# Patient Record
Sex: FEMALE | Employment: FULL TIME | URBAN - METROPOLITAN AREA
[De-identification: names, ages, dates, MRNs, and addresses within clinical notes are randomized per-mention and may not be internally consistent; named-entity substitution may affect disease eponyms.]

---

## 2017-04-12 ENCOUNTER — OFFICE VISIT (OUTPATIENT)
Dept: INTERNAL MEDICINE CLINIC | Facility: CLINIC | Age: 27
End: 2017-04-12

## 2017-04-12 VITALS
BODY MASS INDEX: 25.68 KG/M2 | WEIGHT: 156 LBS | DIASTOLIC BLOOD PRESSURE: 63 MMHG | HEIGHT: 65.5 IN | HEART RATE: 82 BPM | TEMPERATURE: 98 F | RESPIRATION RATE: 20 BRPM | SYSTOLIC BLOOD PRESSURE: 99 MMHG

## 2017-04-12 DIAGNOSIS — R39.89 URINARY PROBLEM: ICD-10-CM

## 2017-04-12 DIAGNOSIS — R42 DIZZINESS: ICD-10-CM

## 2017-04-12 DIAGNOSIS — Z23 NEED FOR TDAP VACCINATION: ICD-10-CM

## 2017-04-12 DIAGNOSIS — R10.30 LOWER ABDOMINAL PAIN: ICD-10-CM

## 2017-04-12 DIAGNOSIS — R53.83 OTHER FATIGUE: Primary | ICD-10-CM

## 2017-04-12 PROCEDURE — 90471 IMMUNIZATION ADMIN: CPT | Performed by: INTERNAL MEDICINE

## 2017-04-12 PROCEDURE — 81002 URINALYSIS NONAUTO W/O SCOPE: CPT | Performed by: INTERNAL MEDICINE

## 2017-04-12 PROCEDURE — 90715 TDAP VACCINE 7 YRS/> IM: CPT | Performed by: INTERNAL MEDICINE

## 2017-04-12 PROCEDURE — 99213 OFFICE O/P EST LOW 20 MIN: CPT | Performed by: INTERNAL MEDICINE

## 2017-04-12 PROCEDURE — 99204 OFFICE O/P NEW MOD 45 MIN: CPT | Performed by: INTERNAL MEDICINE

## 2017-04-12 RX ORDER — METOPROLOL SUCCINATE 25 MG/1
25 TABLET, EXTENDED RELEASE ORAL DAILY
Qty: 90 TABLET | Refills: 0 | Status: SHIPPED | OUTPATIENT
Start: 2017-04-12 | End: 2017-04-12 | Stop reason: CLARIF

## 2017-04-13 NOTE — PROGRESS NOTES
HPI:    Patient ID: Hannah Valdivia is a 31year old female presents for evaluation of several concerns.     HPI  Patient reports that lately she has been feeling extremely fatigued, feels lightheaded when she changes body position, experiences cramps in bot lethargy  Cardiovascular:  Negative for chest pain and irregular heartbeat/palpitations  Respiratory:  Negative for cough, dyspnea and wheezing.   Eyes:  Negative for eye discharge and vision loss  Endocrine:  Negative for polydipsia and polyphagia  Integum Gait normal.   Skin: Skin is warm and dry. No rash noted. Psychiatric: She has a normal mood and affect.  Her behavior is normal. Judgment normal.              ASSESSMENT/PLAN:   Other fatigue  (primary encounter diagnosis) etiology to be determined, most

## 2017-04-24 ENCOUNTER — HOSPITAL ENCOUNTER (OUTPATIENT)
Dept: ULTRASOUND IMAGING | Facility: HOSPITAL | Age: 27
Discharge: HOME | End: 2017-04-24
Attending: INTERNAL MEDICINE
Payer: COMMERCIAL

## 2017-04-24 ENCOUNTER — APPOINTMENT (OUTPATIENT)
Dept: LAB | Facility: HOSPITAL | Age: 27
End: 2017-04-24
Attending: INTERNAL MEDICINE
Payer: COMMERCIAL

## 2017-04-24 DIAGNOSIS — R53.83 OTHER FATIGUE: ICD-10-CM

## 2017-04-24 DIAGNOSIS — R10.30 LOWER ABDOMINAL PAIN: ICD-10-CM

## 2017-04-24 PROCEDURE — 86140 C-REACTIVE PROTEIN: CPT

## 2017-04-24 PROCEDURE — 84443 ASSAY THYROID STIM HORMONE: CPT

## 2017-04-24 PROCEDURE — 76856 US EXAM PELVIC COMPLETE: CPT

## 2017-04-24 PROCEDURE — 83036 HEMOGLOBIN GLYCOSYLATED A1C: CPT

## 2017-04-24 PROCEDURE — 76830 TRANSVAGINAL US NON-OB: CPT

## 2017-04-24 PROCEDURE — 85652 RBC SED RATE AUTOMATED: CPT

## 2017-04-24 PROCEDURE — 82550 ASSAY OF CK (CPK): CPT

## 2017-04-24 PROCEDURE — 36415 COLL VENOUS BLD VENIPUNCTURE: CPT

## 2017-04-24 PROCEDURE — 82306 VITAMIN D 25 HYDROXY: CPT

## 2017-04-24 PROCEDURE — 93975 VASCULAR STUDY: CPT

## 2017-04-26 ENCOUNTER — TELEPHONE (OUTPATIENT)
Dept: INTERNAL MEDICINE CLINIC | Facility: CLINIC | Age: 27
End: 2017-04-26

## 2017-04-26 NOTE — TELEPHONE ENCOUNTER
Notes Recorded by Matthew Dexter MD on 4/25/2017 at 7:27 AM  Please call patient blood test results are stable    Inflammatory markers -normal ,muscle enzymes normal,as well as thyroid hormone is normal    Follow-up with you PCP as discussed at visit

## 2017-04-26 NOTE — TELEPHONE ENCOUNTER
Patient is calling returning Ella's phone. Please call her back at earliest convenience after 5 PM. Please advise.

## 2017-04-27 ENCOUNTER — TELEPHONE (OUTPATIENT)
Dept: FAMILY MEDICINE CLINIC | Facility: CLINIC | Age: 27
End: 2017-04-27

## 2017-04-27 NOTE — TELEPHONE ENCOUNTER
Cristine Whitney is calling from [facility] requesting a CPT Code  And US CPT code and DX for approval

## 2017-05-03 ENCOUNTER — TELEPHONE (OUTPATIENT)
Dept: INTERNAL MEDICINE CLINIC | Facility: CLINIC | Age: 27
End: 2017-05-03

## 2017-05-03 NOTE — TELEPHONE ENCOUNTER
Per Last visit pt was discussing going to [facility]   Pt is requesting to have malaria vaccine  Please advise

## 2017-05-05 ENCOUNTER — TELEPHONE (OUTPATIENT)
Dept: INTERNAL MEDICINE CLINIC | Facility: CLINIC | Age: 27
End: 2017-05-05

## 2017-05-05 RX ORDER — ATOVAQUONE AND PROGUANIL HYDROCHLORIDE 250; 100 MG/1; MG/1
TABLET, FILM COATED ORAL
Qty: 20 TABLET | Refills: 0 | Status: SHIPPED
Start: 2017-05-05 | End: 2017-06-10

## 2017-05-05 NOTE — TELEPHONE ENCOUNTER
Pt returning Dr Lonnie Sol call    Ahsan Bernstein she will be going to [place], [facility]- all within same region of [facility]     Pt said she is working today to 5:00p    Confirmed Francisco J/Bola  Pt also provided number to travel clinic

## 2017-05-05 NOTE — TELEPHONE ENCOUNTER
Spoke to pt   About  medication Malarone appropriate  For travels in the area of the [facility] in [facility] , how to take  medications , precautions  During the trip etc, rx faxed to [employer] [facility] [employer]

## 2017-05-09 ENCOUNTER — TELEPHONE (OUTPATIENT)
Dept: INTERNAL MEDICINE CLINIC | Facility: CLINIC | Age: 27
End: 2017-05-09

## 2017-05-09 DIAGNOSIS — E55.9 VITAMIN D INSUFFICIENCY: Primary | ICD-10-CM

## 2017-05-10 NOTE — TELEPHONE ENCOUNTER
----- Message from Anel Coreas MD sent at 4/28/2017  8:41 AM CDT -----  Please call pt   Low  Vit  D  -  suggest   Vit   D3  2000 U  Qd OTC  Recheck in  6 months     F/u  With PCP soon

## 2017-05-31 NOTE — TELEPHONE ENCOUNTER
Per pt, she did not fully understand about her result and her standing order on file. Pt would like to talk to an RN again.

## 2017-06-01 NOTE — TELEPHONE ENCOUNTER
Spoke with patient (identified name and ), results reviewed and agrees with plan. Patient also given activation code to sign up for mychart.

## 2017-06-10 ENCOUNTER — OFFICE VISIT (OUTPATIENT)
Dept: INTERNAL MEDICINE CLINIC | Facility: CLINIC | Age: 27
End: 2017-06-10

## 2017-06-10 VITALS
SYSTOLIC BLOOD PRESSURE: 97 MMHG | DIASTOLIC BLOOD PRESSURE: 61 MMHG | BODY MASS INDEX: 26 KG/M2 | RESPIRATION RATE: 18 BRPM | WEIGHT: 159 LBS | HEART RATE: 67 BPM

## 2017-06-10 DIAGNOSIS — R14.0 ABDOMINAL BLOATING: Primary | ICD-10-CM

## 2017-06-10 PROBLEM — R42 DIZZINESS: Status: RESOLVED | Noted: 2017-04-12 | Resolved: 2017-06-10

## 2017-06-10 PROBLEM — R39.89 URINARY PROBLEM: Status: RESOLVED | Noted: 2017-04-12 | Resolved: 2017-06-10

## 2017-06-10 PROBLEM — R53.83 FATIGUE: Status: RESOLVED | Noted: 2017-04-12 | Resolved: 2017-06-10

## 2017-06-10 PROCEDURE — 99213 OFFICE O/P EST LOW 20 MIN: CPT | Performed by: INTERNAL MEDICINE

## 2017-06-10 PROCEDURE — 99212 OFFICE O/P EST SF 10 MIN: CPT | Performed by: INTERNAL MEDICINE

## 2017-06-10 RX ORDER — CHOLECALCIFEROL (VITAMIN D3) 125 MCG
CAPSULE ORAL
COMMUNITY

## 2017-06-10 NOTE — PROGRESS NOTES
HPI:    Patient ID: Naty Farris is a 31year old female presents for follow-up on abdominal bloating, lower abdominal discomfort.     HPI  Patient states that periodically she is experiencing sharp pain in the low abdomen, seems it happens 10 days afte present. Pulmonary/Chest: Effort normal and breath sounds normal. No respiratory distress. She has no wheezes. Abdominal: Soft. Bowel sounds are normal. She exhibits no distension and no mass. There is no hepatosplenomegaly. There is no tenderness.  Ther

## 2018-02-01 ENCOUNTER — OFFICE VISIT (OUTPATIENT)
Dept: INTERNAL MEDICINE CLINIC | Facility: CLINIC | Age: 28
End: 2018-02-01

## 2018-02-01 ENCOUNTER — NURSE TRIAGE (OUTPATIENT)
Dept: OTHER | Age: 28
End: 2018-02-01

## 2018-02-01 ENCOUNTER — TELEPHONE (OUTPATIENT)
Dept: INTERNAL MEDICINE CLINIC | Facility: CLINIC | Age: 28
End: 2018-02-01

## 2018-02-01 VITALS
HEART RATE: 69 BPM | RESPIRATION RATE: 20 BRPM | BODY MASS INDEX: 27.76 KG/M2 | DIASTOLIC BLOOD PRESSURE: 75 MMHG | TEMPERATURE: 97 F | SYSTOLIC BLOOD PRESSURE: 120 MMHG | HEIGHT: 65.5 IN | WEIGHT: 168.63 LBS

## 2018-02-01 DIAGNOSIS — L30.9 DERMATITIS: Primary | ICD-10-CM

## 2018-02-01 DIAGNOSIS — J06.9 VIRAL UPPER RESPIRATORY TRACT INFECTION: ICD-10-CM

## 2018-02-01 PROCEDURE — 99213 OFFICE O/P EST LOW 20 MIN: CPT | Performed by: INTERNAL MEDICINE

## 2018-02-01 PROCEDURE — 99212 OFFICE O/P EST SF 10 MIN: CPT | Performed by: INTERNAL MEDICINE

## 2018-02-01 RX ORDER — CLOTRIMAZOLE AND BETAMETHASONE DIPROPIONATE 10; .64 MG/G; MG/G
CREAM TOPICAL
Qty: 1 TUBE | Refills: 0 | Status: SHIPPED | OUTPATIENT
Start: 2018-02-01 | End: 2018-02-22

## 2018-02-01 NOTE — TELEPHONE ENCOUNTER
Pharmacy calling to clarify direction on clotrimazole-betamethasone (LOTRISONE) 1-0.05 % External Cream.  Please advise pt is at the pharmacy

## 2018-02-01 NOTE — TELEPHONE ENCOUNTER
Action Requested: Summary for Provider     []  Critical Lab, Recommendations Needed  [] Need Additional Advice  []   FYI    []   Need Orders  [] Need Medications Sent to Pharmacy  []  Other     SUMMARY: Pt stts got back from [place] on Saturday and noticed

## 2018-02-01 NOTE — PROGRESS NOTES
HPI:    Patient ID: Gloria Herring is a 28year old female. Rash   This is a new (3  weeks  round spots on abdomen  chest   and  r  arm    -  not  itchy   some spreading ) problem. The rash is characterized by dryness and scaling.  Associated symptoms Oval  Erythema central  Clearing  thicker borders   1cm - 2 cm  - lower abdomen   Lower   Chest    r Arm   - no pruritis    Psychiatric: She has a normal mood and affect.  Her behavior is normal.              ASSESSMENT/PLAN:   Dermatitis  (primary encounte

## 2018-02-22 ENCOUNTER — OFFICE VISIT (OUTPATIENT)
Dept: INTERNAL MEDICINE CLINIC | Facility: CLINIC | Age: 28
End: 2018-02-22

## 2018-02-22 VITALS
DIASTOLIC BLOOD PRESSURE: 72 MMHG | TEMPERATURE: 97 F | RESPIRATION RATE: 18 BRPM | BODY MASS INDEX: 27.39 KG/M2 | HEART RATE: 59 BPM | WEIGHT: 166.38 LBS | SYSTOLIC BLOOD PRESSURE: 112 MMHG | HEIGHT: 65.5 IN

## 2018-02-22 DIAGNOSIS — L30.9 DERMATITIS: ICD-10-CM

## 2018-02-22 DIAGNOSIS — K21.9 GASTROESOPHAGEAL REFLUX DISEASE WITHOUT ESOPHAGITIS: Primary | ICD-10-CM

## 2018-02-22 PROCEDURE — 99212 OFFICE O/P EST SF 10 MIN: CPT | Performed by: INTERNAL MEDICINE

## 2018-02-22 PROCEDURE — 99214 OFFICE O/P EST MOD 30 MIN: CPT | Performed by: INTERNAL MEDICINE

## 2018-02-22 RX ORDER — OMEPRAZOLE 40 MG/1
40 CAPSULE, DELAYED RELEASE ORAL DAILY
Qty: 30 CAPSULE | Refills: 1 | Status: SHIPPED | OUTPATIENT
Start: 2018-02-22 | End: 2018-03-24

## 2018-02-22 RX ORDER — CLOTRIMAZOLE AND BETAMETHASONE DIPROPIONATE 10; .64 MG/G; MG/G
CREAM TOPICAL
Qty: 1 TUBE | Refills: 0 | Status: SHIPPED | OUTPATIENT
Start: 2018-02-22

## 2018-02-22 NOTE — PROGRESS NOTES
HPI:    Patient ID: Rin Hernandez is a 28year old female. Rash   This is a chronic (per pt  rash is  almost   gone - with   cream no more  itchiness  ) problem. The problem has been gradually improving since onset.  Location: back and lower  abdomen wheezes. She has no rales. Abdominal: Soft. Bowel sounds are normal. She exhibits no mass. There is no tenderness. There is no rigidity, no rebound, no guarding and no CVA tenderness. Musculoskeletal: She exhibits no edema.    Neurological: She is alert

## 2018-03-17 ENCOUNTER — OFFICE VISIT (OUTPATIENT)
Dept: INTERNAL MEDICINE CLINIC | Facility: CLINIC | Age: 28
End: 2018-03-17

## 2018-03-17 VITALS
HEART RATE: 69 BPM | TEMPERATURE: 97 F | HEIGHT: 66 IN | DIASTOLIC BLOOD PRESSURE: 62 MMHG | WEIGHT: 164 LBS | SYSTOLIC BLOOD PRESSURE: 97 MMHG | BODY MASS INDEX: 26.36 KG/M2

## 2018-03-17 DIAGNOSIS — Z86.19 HISTORY OF HELICOBACTER PYLORI INFECTION: ICD-10-CM

## 2018-03-17 DIAGNOSIS — Z00.00 PHYSICAL EXAM, ANNUAL: Primary | ICD-10-CM

## 2018-03-17 DIAGNOSIS — E55.9 VITAMIN D DEFICIENCY: ICD-10-CM

## 2018-03-17 PROCEDURE — 99395 PREV VISIT EST AGE 18-39: CPT | Performed by: INTERNAL MEDICINE

## 2018-03-17 NOTE — PROGRESS NOTES
HPI:    Patient ID: Liza Vazquez is a 28year old female. Presents for physical exam.    HPI  Patient states that she has been feeling well, she has no particular concerns. Trying to eat healthier, started to exercise more regularly.   She has not don (74.4 kg)   LMP 03/09/2018   BMI 26.47 kg/m²    Physical Exam  Physical Exam     Constitutional: appears well hydrated alert and responsive no acute distress noted  Head/Face: normocephalic  Eyes/Vision: pupils are equal, round and reactive to light conjun W Differential W Platelet [E]      Comp Metabolic Panel (14) [E]      Vitamin D, 25-Hydroxy [E]      H. Pylori Stool Ag, EIA [E]      H PYLORI BREATH TEST [43741][Q]    Meds This Visit:  No prescriptions requested or ordered in this encounter    Imaging &

## 2022-04-12 ENCOUNTER — TELEPHONE (OUTPATIENT)
Dept: RHEUMATOLOGY | Facility: CLINIC | Age: 32
End: 2022-04-12

## 2022-06-21 ENCOUNTER — OFFICE VISIT (OUTPATIENT)
Dept: RHEUMATOLOGY | Facility: CLINIC | Age: 32
End: 2022-06-21
Payer: MEDICAID

## 2022-06-21 ENCOUNTER — LAB ENCOUNTER (OUTPATIENT)
Dept: LAB | Age: 32
End: 2022-06-21
Attending: INTERNAL MEDICINE
Payer: MEDICAID

## 2022-06-21 VITALS
DIASTOLIC BLOOD PRESSURE: 71 MMHG | SYSTOLIC BLOOD PRESSURE: 106 MMHG | HEIGHT: 66 IN | WEIGHT: 209 LBS | BODY MASS INDEX: 33.59 KG/M2 | HEART RATE: 69 BPM

## 2022-06-21 DIAGNOSIS — U09.9 LONG COVID: Primary | ICD-10-CM

## 2022-06-21 DIAGNOSIS — M79.661 BILATERAL CALF PAIN: ICD-10-CM

## 2022-06-21 DIAGNOSIS — R53.83 FATIGUE, UNSPECIFIED TYPE: ICD-10-CM

## 2022-06-21 DIAGNOSIS — E55.9 VITAMIN D DEFICIENCY: ICD-10-CM

## 2022-06-21 DIAGNOSIS — M79.662 BILATERAL CALF PAIN: ICD-10-CM

## 2022-06-21 LAB
CK SERPL-CCNC: 692 U/L
ERYTHROCYTE [SEDIMENTATION RATE] IN BLOOD: 13 MM/HR
THYROGLOB SERPL-MCNC: 49 U/ML (ref ?–60)
THYROPEROXIDASE AB SERPL-ACNC: 1224 U/ML (ref ?–60)
TSI SER-ACNC: 1.51 MIU/ML (ref 0.36–3.74)

## 2022-06-21 PROCEDURE — 86800 THYROGLOBULIN ANTIBODY: CPT | Performed by: INTERNAL MEDICINE

## 2022-06-21 PROCEDURE — 82550 ASSAY OF CK (CPK): CPT | Performed by: INTERNAL MEDICINE

## 2022-06-21 PROCEDURE — 84443 ASSAY THYROID STIM HORMONE: CPT | Performed by: INTERNAL MEDICINE

## 2022-06-21 PROCEDURE — 36415 COLL VENOUS BLD VENIPUNCTURE: CPT | Performed by: INTERNAL MEDICINE

## 2022-06-21 PROCEDURE — 86376 MICROSOMAL ANTIBODY EACH: CPT | Performed by: INTERNAL MEDICINE

## 2022-06-21 PROCEDURE — 85652 RBC SED RATE AUTOMATED: CPT | Performed by: INTERNAL MEDICINE

## 2022-06-21 PROCEDURE — 82085 ASSAY OF ALDOLASE: CPT | Performed by: INTERNAL MEDICINE

## 2022-06-21 NOTE — PROGRESS NOTES
Dear Gia Aguilar NP, [facility],    I saw your patient Dannielle Chilel in consultation this afternoon at your request, for evaluation of problems that she has had since having COVID infection in December of 2020. She was hospitalized for 4 days at [facility], with pulmonary embolus and pneumonia. 2 months later her hair fell out. It filled back in, and hasn't again 'fallen'. After her hospitalization, she developed discomfort in her calves. She worked with physical therapy for 3 months in late 2021, which helped some, but she could not return to aerobics. Her legs will cramp. She has been back to physical therapy since March of 2022, and is doing better. She has had discomfort in her hip muscles and buttock muscles. She is going to [employer], and can do aerobics classes, which is much better. She is much better since a statin was discontinued March of 2022. Her cholesterol had been high. An ultrasound was done of her bilateral lower extremities in early 2021, and were negative for blood clots. Blood work was done March 11th, 2022. CBC, CMP, B12, folate, and vitamin D were normal.  CPK was elevated at 463. When she walks 10 minutes, her legs primarily from her knees to ankles feel heavy, like they are 'stones'. She stops walking, remains standing, gets better, and then can walk again. There has occasionally been mild swelling in her distal lower extremities. 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 Labs were done at [place] April of 2017. Sed rate and C-reactive protein were normal.  CPK was 73.  25 hydroxy vitamin D 15.7, TSH normal, urinalysis negative. She took vitamin D supplements for awhile, but has not been recently. She only takes a multivitamin daily. Review of systems:  She has a history of gastritis. She is continues to feel bloated and has excessive gas, although it is better with diet.   She takes a multivitamin every day. No known drug allergies. Family history: There is a lot of cancer in her family including stomach and thyroid. Her brother developed diabetes in his late 25s. She does not know of any autoimmune disorders. Social history:  She is single. She is finishing law school in August of this year. No cigarettes. Social alcohol. She drinks coffee and red bowls. She is now going to the gym 4-5 times a week. 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 She gave that up in January, so is now getting 6 to 7 hours of sleep. She still tosses and turns. She is not refreshed on awakening. Her energy can be low. When she gets out of bed in the morning her distal lower extremities can be swollen but that is better. No fevers, chills, sweats, anorexia. She has gained 70 pounds over the last year and a half. She is trying to lose but cannot. No rash or sun sensitive skin. Her eyes burned for a long time and she had gel drops that she used. That burning sensation now has gone away. She has usual dry mouth. No oral or nasal ulcers. No lymphadenopathy. She was short of breath 12 months after her COVID infection, but that went away. No chest pain. No acid reflux, stomach pain, nausea or vomiting, constipation or diarrhea, blood in her stools. No trouble urinating. No Raynaud's. No headache. Physical exam:  Pleasant woman in no acute distress. Blood pressure 106/71, pulse 69, height 5' 6\" (1.676 m), weight 209 lb (94.8 kg). No rash. No alopecia. No conjunctival injection. No nasal oral lesions. Good salivary pool. No cervical adenopathy. Lungs clear. S1 and S2 regular. Abdomen without hepatosplenomegaly or tenderness. Normal bowel sounds. No lower extremity edema. Neck moves well. Shoulders, elbows, wrist, and hands are normal.  Lumbar spine flexion is good. Hips and knees move normally as to her ankles.   The balls of her feet are nontender to squeeze. She does not have myofascial discomfort on exam.  Her calves are tender to palpate. Deltoid and iliopsoas strength is 5 out of 5 bilaterally. Biceps, triceps,  strength, quad, and hamstring strength is normal bilaterally. Assessment and plan:    1. COVID 19 infection December of 2020, with pulmonary embolus and pneumonia. Her hair fell out 2 months later, but filled back in. She has had heaviness and discomfort in her calves since early 2021. Also generalized muscle weakness. She has gained 70 pounds, and has not been able to lose weight, despite going to the gym now 4-5 times a week. Her CPK was 463 in March of 2022. Her statin was discontinued in March of 2022, and she feels significantly better since stopping. She can now go to the gym. Statin myopathy is quite possible. Possible thyroid myopathy. She cannot lose weight. I ordered repeat CPK, aldolase, as well as TSH and free T3 and free T4 levels. I will give her a call with her results. She will continue working on her exercise program.    2.  70 pound weight gain over the last 1 1/2 years. Occasional lower extremity edema. Fatigue. Thyroid tests will be done. 3.  History of vitamin D deficiency. Her level was normal in March of 2022.    4.  Insomnia. Inadequate sleep. She is getting more hours since she stopped working in addition to going to law school in early 2022. I will see her back if needed. Thank you for inviting me to participate in her care.       Sincerely,      Dustin Jaime MD   Rheumatology

## 2022-06-23 LAB — ALDOLASE, SERUM: 12 U/L

## 2022-06-30 ENCOUNTER — TELEPHONE (OUTPATIENT)
Dept: RHEUMATOLOGY | Facility: CLINIC | Age: 32
End: 2022-06-30

## 2022-06-30 NOTE — TELEPHONE ENCOUNTER
I called [facility] with her lab results. Her CPK remains high at 692. Her aldolase is high at 12 (1.2-7.6). Her thyroid peroxidase antibodies are positive at 1224. Thyroglobulin antibody negative. TSH normal.  Sed rate 13. She continues to workout at the gym, and continues to get stronger. She is feeling good. It sounds like the statin was affecting her muscles. She will schedule a follow-up in 1 month. I will repeat her muscle enzyme tests and her exam.    She has thyroid peroxidase antibodies, so may become hypothyroid at some point. I will repeat her thyroid antibodies when she follows up in 1 month as well. I will let Arianna Moralez, her nurse practitioner know.

## 2022-07-01 ENCOUNTER — PATIENT MESSAGE (OUTPATIENT)
Dept: RHEUMATOLOGY | Facility: CLINIC | Age: 32
End: 2022-07-01

## 2022-07-01 NOTE — TELEPHONE ENCOUNTER
From: Aleyda Byrd  To: Ysabel Rodriguez MD  Sent: 7/1/2022 10:59 AM CDT  Subject: Sending results over to physical therapist     Hello,    Please send my most recent test results to my physical therapist at [address] [place] [address]. The fax number is [phone]. Thank you.   Jonathan Griffin

## 2022-08-17 ENCOUNTER — LAB ENCOUNTER (OUTPATIENT)
Dept: LAB | Age: 32
End: 2022-08-17
Attending: INTERNAL MEDICINE
Payer: MEDICAID

## 2022-08-17 ENCOUNTER — OFFICE VISIT (OUTPATIENT)
Dept: RHEUMATOLOGY | Facility: CLINIC | Age: 32
End: 2022-08-17
Payer: MEDICAID

## 2022-08-17 VITALS
DIASTOLIC BLOOD PRESSURE: 73 MMHG | HEIGHT: 66 IN | BODY MASS INDEX: 32.95 KG/M2 | SYSTOLIC BLOOD PRESSURE: 106 MMHG | WEIGHT: 205 LBS | HEART RATE: 78 BPM

## 2022-08-17 DIAGNOSIS — R53.83 FATIGUE, UNSPECIFIED TYPE: Primary | ICD-10-CM

## 2022-08-17 DIAGNOSIS — E06.3 HASHIMOTO'S THYROIDITIS: ICD-10-CM

## 2022-08-17 DIAGNOSIS — F41.9 ANXIETY: ICD-10-CM

## 2022-08-17 DIAGNOSIS — R74.8 ELEVATED CPK: ICD-10-CM

## 2022-08-17 LAB
ALBUMIN SERPL-MCNC: 4 G/DL (ref 3.4–5)
ALBUMIN/GLOB SERPL: 1.1 {RATIO} (ref 1–2)
ALP LIVER SERPL-CCNC: 61 U/L
ALT SERPL-CCNC: 32 U/L
ANION GAP SERPL CALC-SCNC: 7 MMOL/L (ref 0–18)
AST SERPL-CCNC: 17 U/L (ref 15–37)
BILIRUB SERPL-MCNC: 0.5 MG/DL (ref 0.1–2)
BUN BLD-MCNC: 11 MG/DL (ref 7–18)
BUN/CREAT SERPL: 12.8 (ref 10–20)
CALCIUM BLD-MCNC: 9.3 MG/DL (ref 8.5–10.1)
CHLORIDE SERPL-SCNC: 106 MMOL/L (ref 98–112)
CK SERPL-CCNC: 55 U/L
CO2 SERPL-SCNC: 24 MMOL/L (ref 21–32)
CREAT BLD-MCNC: 0.86 MG/DL
DEPRECATED RDW RBC AUTO: 42 FL (ref 35.1–46.3)
ERYTHROCYTE [DISTWIDTH] IN BLOOD BY AUTOMATED COUNT: 12.4 % (ref 11–15)
FASTING STATUS PATIENT QL REPORTED: YES
GFR SERPLBLD BASED ON 1.73 SQ M-ARVRAT: 91 ML/MIN/1.73M2 (ref 60–?)
GLOBULIN PLAS-MCNC: 3.5 G/DL (ref 2.8–4.4)
GLUCOSE BLD-MCNC: 95 MG/DL (ref 70–99)
HCT VFR BLD AUTO: 43.9 %
HGB BLD-MCNC: 14.5 G/DL
MCH RBC QN AUTO: 30.4 PG (ref 26–34)
MCHC RBC AUTO-ENTMCNC: 33 G/DL (ref 31–37)
MCV RBC AUTO: 92 FL
OSMOLALITY SERPL CALC.SUM OF ELEC: 283 MOSM/KG (ref 275–295)
PLATELET # BLD AUTO: 252 10(3)UL (ref 150–450)
POTASSIUM SERPL-SCNC: 4.3 MMOL/L (ref 3.5–5.1)
PROT SERPL-MCNC: 7.5 G/DL (ref 6.4–8.2)
RBC # BLD AUTO: 4.77 X10(6)UL
SODIUM SERPL-SCNC: 137 MMOL/L (ref 136–145)
T3FREE SERPL-MCNC: 2.52 PG/ML (ref 2.4–4.2)
T4 FREE SERPL-MCNC: 0.8 NG/DL (ref 0.8–1.7)
TSI SER-ACNC: 1.49 MIU/ML (ref 0.36–3.74)
WBC # BLD AUTO: 8.4 X10(3) UL (ref 4–11)

## 2022-08-17 PROCEDURE — 82550 ASSAY OF CK (CPK): CPT | Performed by: INTERNAL MEDICINE

## 2022-08-17 PROCEDURE — 36415 COLL VENOUS BLD VENIPUNCTURE: CPT | Performed by: INTERNAL MEDICINE

## 2022-08-17 PROCEDURE — 3078F DIAST BP <80 MM HG: CPT | Performed by: INTERNAL MEDICINE

## 2022-08-17 PROCEDURE — 3074F SYST BP LT 130 MM HG: CPT | Performed by: INTERNAL MEDICINE

## 2022-08-17 PROCEDURE — 80053 COMPREHEN METABOLIC PANEL: CPT | Performed by: INTERNAL MEDICINE

## 2022-08-17 PROCEDURE — 99213 OFFICE O/P EST LOW 20 MIN: CPT | Performed by: INTERNAL MEDICINE

## 2022-08-17 PROCEDURE — 85027 COMPLETE CBC AUTOMATED: CPT | Performed by: INTERNAL MEDICINE

## 2022-08-17 PROCEDURE — 84443 ASSAY THYROID STIM HORMONE: CPT | Performed by: INTERNAL MEDICINE

## 2022-08-17 PROCEDURE — 3008F BODY MASS INDEX DOCD: CPT | Performed by: INTERNAL MEDICINE

## 2022-08-17 PROCEDURE — 84481 FREE ASSAY (FT-3): CPT | Performed by: INTERNAL MEDICINE

## 2022-08-17 PROCEDURE — 84439 ASSAY OF FREE THYROXINE: CPT | Performed by: INTERNAL MEDICINE

## 2022-08-17 PROCEDURE — 82085 ASSAY OF ALDOLASE: CPT | Performed by: INTERNAL MEDICINE

## 2022-08-17 RX ORDER — ALPRAZOLAM 0.5 MG/1
0.5 TABLET ORAL DAILY PRN
COMMUNITY
Start: 2022-07-20

## 2022-08-20 LAB — ALDOLASE, SERUM: 3 U/L

## 2022-08-24 ENCOUNTER — TELEPHONE (OUTPATIENT)
Dept: RHEUMATOLOGY | Facility: CLINIC | Age: 32
End: 2022-08-24

## 2022-08-24 NOTE — TELEPHONE ENCOUNTER
I called Caitlin Stein with her lab results from August 17th of 2022. Her muscle enzymes are down to normal, both CPK and aldolase. Her TSH, free T3 and free T4 levels are normal.    Her CBC and CMP are normal.    There is no evidence of rheumatologic disorder. She may become hypothyroid at some point, with her elevated thyroid peroxidase antibodies. She will find a new primary care provider.

## 2023-09-08 ENCOUNTER — LAB ENCOUNTER (OUTPATIENT)
Dept: LAB | Age: 33
End: 2023-09-08
Attending: FAMILY MEDICINE
Payer: MEDICAID

## 2023-09-08 ENCOUNTER — OFFICE VISIT (OUTPATIENT)
Dept: FAMILY MEDICINE CLINIC | Facility: CLINIC | Age: 33
End: 2023-09-08

## 2023-09-08 VITALS
DIASTOLIC BLOOD PRESSURE: 81 MMHG | OXYGEN SATURATION: 97 % | HEART RATE: 79 BPM | WEIGHT: 205.63 LBS | BODY MASS INDEX: 33.05 KG/M2 | HEIGHT: 66 IN | SYSTOLIC BLOOD PRESSURE: 118 MMHG | RESPIRATION RATE: 18 BRPM

## 2023-09-08 DIAGNOSIS — E03.9 HYPOTHYROIDISM, UNSPECIFIED TYPE: Primary | ICD-10-CM

## 2023-09-08 DIAGNOSIS — E78.1 PURE HYPERTRIGLYCERIDEMIA: ICD-10-CM

## 2023-09-08 DIAGNOSIS — Z80.9 MATERNAL FAMILY HISTORY OF CANCER: ICD-10-CM

## 2023-09-08 DIAGNOSIS — Z00.00 ANNUAL PHYSICAL EXAM: ICD-10-CM

## 2023-09-08 LAB
ALBUMIN SERPL-MCNC: 3.9 G/DL (ref 3.4–5)
ALBUMIN/GLOB SERPL: 1.3 {RATIO} (ref 1–2)
ALP LIVER SERPL-CCNC: 56 U/L
ALT SERPL-CCNC: 39 U/L
ANION GAP SERPL CALC-SCNC: 6 MMOL/L (ref 0–18)
AST SERPL-CCNC: 21 U/L (ref 15–37)
BASOPHILS # BLD AUTO: 0.04 X10(3) UL (ref 0–0.2)
BASOPHILS NFR BLD AUTO: 0.6 %
BILIRUB SERPL-MCNC: 0.3 MG/DL (ref 0.1–2)
BUN BLD-MCNC: 12 MG/DL (ref 7–18)
BUN/CREAT SERPL: 13.5 (ref 10–20)
CALCIUM BLD-MCNC: 8.5 MG/DL (ref 8.5–10.1)
CHLORIDE SERPL-SCNC: 108 MMOL/L (ref 98–112)
CHOLEST SERPL-MCNC: 202 MG/DL (ref ?–200)
CO2 SERPL-SCNC: 25 MMOL/L (ref 21–32)
CREAT BLD-MCNC: 0.89 MG/DL
DEPRECATED RDW RBC AUTO: 41.6 FL (ref 35.1–46.3)
EGFRCR SERPLBLD CKD-EPI 2021: 87 ML/MIN/1.73M2 (ref 60–?)
EOSINOPHIL # BLD AUTO: 0.2 X10(3) UL (ref 0–0.7)
EOSINOPHIL NFR BLD AUTO: 3 %
ERYTHROCYTE [DISTWIDTH] IN BLOOD BY AUTOMATED COUNT: 12.3 % (ref 11–15)
EST. AVERAGE GLUCOSE BLD GHB EST-MCNC: 111 MG/DL (ref 68–126)
FASTING PATIENT LIPID ANSWER: YES
FASTING STATUS PATIENT QL REPORTED: YES
GLOBULIN PLAS-MCNC: 3.1 G/DL (ref 2.8–4.4)
GLUCOSE BLD-MCNC: 95 MG/DL (ref 70–99)
HBA1C MFR BLD: 5.5 % (ref ?–5.7)
HCT VFR BLD AUTO: 41.2 %
HDLC SERPL-MCNC: 29 MG/DL (ref 40–59)
HGB BLD-MCNC: 13.8 G/DL
IMM GRANULOCYTES # BLD AUTO: 0.01 X10(3) UL (ref 0–1)
IMM GRANULOCYTES NFR BLD: 0.2 %
LDLC SERPL CALC-MCNC: 115 MG/DL (ref ?–100)
LYMPHOCYTES # BLD AUTO: 2.05 X10(3) UL (ref 1–4)
LYMPHOCYTES NFR BLD AUTO: 31.2 %
MCH RBC QN AUTO: 30.9 PG (ref 26–34)
MCHC RBC AUTO-ENTMCNC: 33.5 G/DL (ref 31–37)
MCV RBC AUTO: 92.2 FL
MONOCYTES # BLD AUTO: 0.29 X10(3) UL (ref 0.1–1)
MONOCYTES NFR BLD AUTO: 4.4 %
NEUTROPHILS # BLD AUTO: 3.98 X10 (3) UL (ref 1.5–7.7)
NEUTROPHILS # BLD AUTO: 3.98 X10(3) UL (ref 1.5–7.7)
NEUTROPHILS NFR BLD AUTO: 60.6 %
NONHDLC SERPL-MCNC: 173 MG/DL (ref ?–130)
OSMOLALITY SERPL CALC.SUM OF ELEC: 288 MOSM/KG (ref 275–295)
PLATELET # BLD AUTO: 260 10(3)UL (ref 150–450)
POTASSIUM SERPL-SCNC: 4.2 MMOL/L (ref 3.5–5.1)
PROT SERPL-MCNC: 7 G/DL (ref 6.4–8.2)
RBC # BLD AUTO: 4.47 X10(6)UL
SODIUM SERPL-SCNC: 139 MMOL/L (ref 136–145)
THYROPEROXIDASE AB SERPL-ACNC: >1300 U/ML (ref ?–60)
TRIGL SERPL-MCNC: 334 MG/DL (ref 30–149)
TSI SER-ACNC: 1.95 MIU/ML (ref 0.36–3.74)
VLDLC SERPL CALC-MCNC: 59 MG/DL (ref 0–30)
WBC # BLD AUTO: 6.6 X10(3) UL (ref 4–11)

## 2023-09-08 PROCEDURE — 85025 COMPLETE CBC W/AUTO DIFF WBC: CPT | Performed by: FAMILY MEDICINE

## 2023-09-08 PROCEDURE — 36415 COLL VENOUS BLD VENIPUNCTURE: CPT | Performed by: FAMILY MEDICINE

## 2023-09-08 PROCEDURE — 83036 HEMOGLOBIN GLYCOSYLATED A1C: CPT | Performed by: FAMILY MEDICINE

## 2023-09-08 PROCEDURE — 80053 COMPREHEN METABOLIC PANEL: CPT | Performed by: FAMILY MEDICINE

## 2023-09-08 PROCEDURE — 80061 LIPID PANEL: CPT | Performed by: FAMILY MEDICINE

## 2023-09-08 PROCEDURE — 84443 ASSAY THYROID STIM HORMONE: CPT | Performed by: FAMILY MEDICINE

## 2023-09-08 PROCEDURE — 86376 MICROSOMAL ANTIBODY EACH: CPT | Performed by: FAMILY MEDICINE

## 2023-09-08 RX ORDER — VITAMIN B COMPLEX
TABLET ORAL
COMMUNITY
Start: 2023-05-26 | End: 2023-09-08

## 2023-09-08 RX ORDER — CHOLECALCIFEROL (VITAMIN D3) 125 MCG
1 CAPSULE ORAL DAILY
Qty: 90 CAPSULE | Refills: 3 | Status: SHIPPED | OUTPATIENT
Start: 2023-09-08 | End: 2024-09-07

## 2023-09-08 RX ORDER — LEVOTHYROXINE SODIUM 0.05 MG/1
TABLET ORAL
COMMUNITY
Start: 2023-05-26

## 2023-09-08 RX ORDER — DOXYCYCLINE HYCLATE 100 MG/1
100 CAPSULE ORAL 2 TIMES DAILY
COMMUNITY
Start: 2023-02-17

## 2023-09-08 NOTE — PROGRESS NOTES
Subjective:   Patient ID: Josie Elder is a 28year old female. HPI  Here for new patient visit   History reviewed     History/Other:   Review of Systems    Constitutional: Negative. Negative for activity change, appetite change, diaphoresis and fatigue. Respiratory: Negative. Negative for apnea, cough, chest tightness and shortness of breath. Cardiovascular: Negative. Negative for chest pain, palpitations and leg swelling. Gastrointestinal: Negative. Negative for abdominal pain. Skin: Negative. Psychiatric/Behavioral: Negative. Current Outpatient Medications   Medication Sig Dispense Refill    doxycycline 100 MG Oral Cap Take 1 capsule (100 mg total) by mouth 2 (two) times daily. levothyroxine (EUTHYROX) 50 MCG Oral Tab       metFORMIN (GLUCOPHAGE) 500 MG Oral Tab       Cholecalciferol (VITAMIN D) 125 MCG (5000 UT) Oral Cap Take 1 capsule (5,000 Units total) by mouth daily. 90 capsule 3    ALPRAZolam 0.5 MG Oral Tab Take 1 tablet (0.5 mg total) by mouth daily as needed. Allergies:No Known Allergies    Objective:   Physical Exam  Constitutional:       Appearance: She is well-developed. Cardiovascular:      Rate and Rhythm: Normal rate and regular rhythm. Heart sounds: Normal heart sounds. Pulmonary:      Effort: Pulmonary effort is normal.      Breath sounds: Normal breath sounds. Abdominal:      General: Bowel sounds are normal.      Palpations: Abdomen is soft. Skin:     General: Skin is warm and dry. Neurological:      Mental Status: She is alert. Deep Tendon Reflexes: Reflexes are normal and symmetric.          Assessment & Plan:   Hypothyroidism, unspecified type  (primary encounter diagnosis)  Annual physical exam  Maternal family history of cancer  Cpom   Diet and exercise discussed   F/u in 3-6 months   Orders Placed This Encounter      Comp Metabolic Panel (14)      Hemoglobin A1C      Lipid Panel      Assay, Thyroid Stim Hormone CBC With Differential With Platelet      Thyroid Peroxidase (TPO) AB      Meds This Visit:  Requested Prescriptions     Signed Prescriptions Disp Refills    Cholecalciferol (VITAMIN D) 125 MCG (5000 UT) Oral Cap 90 capsule 3     Sig: Take 1 capsule (5,000 Units total) by mouth daily.        Imaging & Referrals:  ENDOCRINOLOGY - INTERNAL  OP REFERRAL TO GENETIC COUNSELOR

## 2023-09-09 RX ORDER — ICOSAPENT ETHYL 1000 MG/1
2 CAPSULE ORAL 2 TIMES DAILY
Qty: 360 CAPSULE | Refills: 1 | Status: SHIPPED | OUTPATIENT
Start: 2023-09-09 | End: 2023-12-08

## 2023-09-12 ENCOUNTER — TELEPHONE (OUTPATIENT)
Dept: FAMILY MEDICINE CLINIC | Facility: CLINIC | Age: 33
End: 2023-09-12

## 2023-09-12 DIAGNOSIS — E78.1 HYPERTRIGLYCERIDEMIA: Primary | ICD-10-CM

## 2023-09-12 RX ORDER — OMEGA-3-ACID ETHYL ESTERS 1 G/1
2 CAPSULE, LIQUID FILLED ORAL 2 TIMES DAILY
Qty: 360 CAPSULE | Refills: 5 | Status: SHIPPED | OUTPATIENT
Start: 2023-09-12

## 2023-09-13 RX ORDER — GEMFIBROZIL 600 MG/1
600 TABLET, FILM COATED ORAL
Qty: 360 TABLET | Refills: 1 | Status: SHIPPED | OUTPATIENT
Start: 2023-09-13

## 2023-09-19 ENCOUNTER — OFFICE VISIT (OUTPATIENT)
Dept: ENDOCRINOLOGY CLINIC | Facility: CLINIC | Age: 33
End: 2023-09-19

## 2023-09-19 VITALS
BODY MASS INDEX: 33.43 KG/M2 | WEIGHT: 208 LBS | HEART RATE: 90 BPM | HEIGHT: 66 IN | DIASTOLIC BLOOD PRESSURE: 72 MMHG | SYSTOLIC BLOOD PRESSURE: 102 MMHG

## 2023-09-19 DIAGNOSIS — E55.9 VITAMIN D DEFICIENCY: Primary | ICD-10-CM

## 2023-09-19 DIAGNOSIS — E03.8 HYPOTHYROIDISM DUE TO HASHIMOTO'S THYROIDITIS: ICD-10-CM

## 2023-09-19 DIAGNOSIS — E06.3 HYPOTHYROIDISM DUE TO HASHIMOTO'S THYROIDITIS: ICD-10-CM

## 2023-09-19 PROCEDURE — 3078F DIAST BP <80 MM HG: CPT | Performed by: INTERNAL MEDICINE

## 2023-09-19 PROCEDURE — 99244 OFF/OP CNSLTJ NEW/EST MOD 40: CPT | Performed by: INTERNAL MEDICINE

## 2023-09-19 PROCEDURE — 3074F SYST BP LT 130 MM HG: CPT | Performed by: INTERNAL MEDICINE

## 2023-09-19 PROCEDURE — 3008F BODY MASS INDEX DOCD: CPT | Performed by: INTERNAL MEDICINE

## 2023-09-19 RX ORDER — LEVOTHYROXINE SODIUM 75 UG/1
75 TABLET ORAL
Qty: 90 TABLET | Refills: 0 | Status: SHIPPED | OUTPATIENT
Start: 2023-09-19 | End: 2023-12-18

## 2023-09-21 ENCOUNTER — APPOINTMENT (OUTPATIENT)
Dept: HEMATOLOGY/ONCOLOGY | Facility: HOSPITAL | Age: 33
End: 2023-09-21
Attending: GENETIC COUNSELOR, MS
Payer: MEDICAID

## 2023-09-21 ENCOUNTER — APPOINTMENT (OUTPATIENT)
Dept: GENETICS | Facility: HOSPITAL | Age: 33
End: 2023-09-21
Attending: GENETIC COUNSELOR, MS
Payer: MEDICAID

## 2024-03-27 ENCOUNTER — OFFICE VISIT (OUTPATIENT)
Dept: DERMATOLOGY CLINIC | Facility: CLINIC | Age: 34
End: 2024-03-27
Payer: MEDICAID

## 2024-03-27 DIAGNOSIS — L91.8 INFLAMED ACROCHORDON: ICD-10-CM

## 2024-03-27 DIAGNOSIS — D22.9 MULTIPLE BENIGN NEVI: Primary | ICD-10-CM

## 2024-03-27 PROCEDURE — 99203 OFFICE O/P NEW LOW 30 MIN: CPT | Performed by: STUDENT IN AN ORGANIZED HEALTH CARE EDUCATION/TRAINING PROGRAM

## 2024-03-27 PROCEDURE — 11200 RMVL SKIN TAGS UP TO&INC 15: CPT | Performed by: STUDENT IN AN ORGANIZED HEALTH CARE EDUCATION/TRAINING PROGRAM

## 2024-03-27 NOTE — PROGRESS NOTES
New Patient    Referred by: No referring provider defined for this encounter.    CHIEF COMPLAINT: Lesion of concern     HISTORY OF PRESENT ILLNESS: Cheryl Dejesus is a 33 year old female here for evaluation of lesion of concern.    1. Growth   Location: Under L eye  Duration: 2-3 years  Signs and symptoms: None  Current treatment: None  Past treatments: None        Personal Dermatologic History  History of skin cancer: No  History of  atypical moles: No    FAMILY HISTORY:  History of melanoma: No    Past Medical History  Past Medical History:   Diagnosis Date    Hashimoto's thyroiditis     Pulmonary embolus (HCC)        REVIEW OF SYSTEMS:  Constitutional: Denies fever, chills, unintentional weight loss.   Skin as per HPI    Medications  Current Outpatient Medications   Medication Sig Dispense Refill    gemfibrozil 600 MG Oral Tab Take 1 tablet (600 mg total) by mouth 2 (two) times daily before meals. 360 tablet 1    omega-3-acid ethyl esters 1 g Oral Cap Take 2 capsules (2 g total) by mouth 2 (two) times daily. 360 capsule 5    doxycycline 100 MG Oral Cap Take 1 capsule (100 mg total) by mouth 2 (two) times daily.      levothyroxine (EUTHYROX) 50 MCG Oral Tab       metFORMIN (GLUCOPHAGE) 500 MG Oral Tab       Cholecalciferol (VITAMIN D) 125 MCG (5000 UT) Oral Cap Take 1 capsule (5,000 Units total) by mouth daily. 90 capsule 3    ALPRAZolam 0.5 MG Oral Tab Take 1 tablet (0.5 mg total) by mouth daily as needed.         PHYSICAL EXAM:  General: awake, alert, no acute distress  Neuropsych: appropriate mood and affect  Eyes: Sclerae anicteric, without conjunctival injection, eyelids unremarkable  Skin: Skin exam was performed today including the following: face. Pertinent findings include:   - with 4 inflamed pedunculated papules on eyelids  - with regular brown papules on cheeks    ASSESSMENT & PLAN:  Pathophysiology of diagnoses discussed with patient.  Therapeutic options reviewed. Risks, benefits, and  alternatives discussed with patient. Instructions reviewed at length.    #Multiple benign nevi  - Reassured patient of benign nature of these lesions.   - Return for lesions that are new, growing, changing or symptomatic.   - Recommend daily photoprotection with broad-spectrum sunscreen (SPF 30 daily with reapplication every 2 hours), avoidance of sun during peak hours, and sun protective clothing.   - Dermoscopy was used for physical examination of pigmented lesions during today's office visit.    #Acrochordons, inflamed   - Skin tags are in locations where friction from clothing, jewelry, shaving cause pain, frequent trauma, bleeding occur and patient requests removal.  - Using sterile scissors,  2 inflamed skin tags were snipped off at their bases after cleansing with alcochol. Hemostasis with aluminum chloride.  Bandaids placed. Would care discussed.     - Cautery, low, blunt tip at 1.8 to lesions on upper eyelids   Risks (including, but not limited to scarring, pain, bleeding, infection, dyschromia) benefits, alternatives and personnel discussed with patient who consents to proceed. Vaseline applied after      Return to clinic: as needed or sooner if something concerning arises     Dm Meeks MD

## (undated) NOTE — Clinical Note
6/10/2017              [address] [address]         Patient is in good health. She mas study abroad.       Sincerely,    Miya Daly MD  [facility], [facility], [address] [place] [address]

## (undated) NOTE — LETTER
12/19/17        [address] [facility]  [place] [address]      Dear Danay Cervantes,    [address] records indicate that you have outstanding lab work and or testing that was ordered for you and has not yet been completed:          Vitamin D, 25-Hydrox

## (undated) NOTE — MR AVS SNAPSHOT
[facility]  [address] [place] [address], [address] [place] [address]  [address] Nguy[address]n               Thank you for choosing us for your health care visit with Johnnie Gurrola MD.  We are glad to serve you and happy to provide you with this summary of [facility] (Blue Parking) (Yellow Parking)  [address]   [address] [facility],  Matthewe Nolan Lezama Said, [address] [facility]  [address]  [address] [facility] If you have questions, you can call [phone] to talk to our [facility] Staff. Remember, [employer] is NOT to be used for urgent needs. For medical emergencies, dial 911.         Educational Information     Healthy Diet and Regular Exercise  The [facility]

## (undated) NOTE — LETTER
01/02/18        [address] [facility]  [place] [address]      Dear Kathia Kunz,    [address] records indicate that you have outstanding lab work and or testing that was ordered for you and has not yet been completed:        H. Pylori Stool Ag, EI

## (undated) NOTE — MR AVS SNAPSHOT
[facility]  [address] [place] [address], [address] [place] [address]               Thank you for choosing us for your health care visit with Fadi Alves MD.  We are glad to serve you and happy to provide you with this summary of yo

## (undated) NOTE — Clinical Note
April 26, 2017     [address] [facility] [address]      Dear Nobie Aid:    Below are the results from your recent visit:    Inflammatory markers -normal ,muscle enzymes normal,as well as thyroid hormone is normal     Follow-up

## (undated) NOTE — LETTER
April 26, 2017     [address] [place] [id number]      Dear Oscar Mijares:    Below are the results from your recent visit:    CONCLUSION: Normal ultrasound appearance of the uterus and ovaries.  Color flow and Doppler ovarian marilu